# Patient Record
Sex: FEMALE | Race: WHITE | HISPANIC OR LATINO
[De-identification: names, ages, dates, MRNs, and addresses within clinical notes are randomized per-mention and may not be internally consistent; named-entity substitution may affect disease eponyms.]

---

## 2017-01-17 ENCOUNTER — RECORD ABSTRACTING (OUTPATIENT)
Age: 47
End: 2017-01-17

## 2017-01-17 DIAGNOSIS — R87.610 ATYPICAL SQUAMOUS CELLS OF UNDETERMINED SIGNIFICANCE ON CYTOLOGIC SMEAR OF CERVIX (ASC-US): ICD-10-CM

## 2017-01-17 DIAGNOSIS — Z78.9 OTHER SPECIFIED HEALTH STATUS: ICD-10-CM

## 2017-01-17 DIAGNOSIS — Z12.4 ENCOUNTER FOR SCREENING FOR MALIGNANT NEOPLASM OF CERVIX: ICD-10-CM

## 2017-01-17 DIAGNOSIS — Z86.018 PERSONAL HISTORY OF OTHER BENIGN NEOPLASM: ICD-10-CM

## 2017-03-15 ENCOUNTER — OUTPATIENT (OUTPATIENT)
Dept: OUTPATIENT SERVICES | Facility: HOSPITAL | Age: 47
LOS: 1 days | Discharge: HOME | End: 2017-03-15

## 2017-03-15 ENCOUNTER — APPOINTMENT (OUTPATIENT)
Dept: INTERNAL MEDICINE | Facility: CLINIC | Age: 47
End: 2017-03-15

## 2017-03-15 VITALS
BODY MASS INDEX: 25.52 KG/M2 | DIASTOLIC BLOOD PRESSURE: 68 MMHG | SYSTOLIC BLOOD PRESSURE: 104 MMHG | HEART RATE: 62 BPM | WEIGHT: 130 LBS | HEIGHT: 60 IN

## 2017-03-15 DIAGNOSIS — R73.03 PREDIABETES.: ICD-10-CM

## 2017-03-15 DIAGNOSIS — L30.9 DERMATITIS, UNSPECIFIED: ICD-10-CM

## 2017-03-15 DIAGNOSIS — D17.0 BENIGN LIPOMATOUS NEOPLASM OF SKIN AND SUBCUTANEOUS TISSUE OF HEAD, FACE AND NECK: ICD-10-CM

## 2017-03-15 DIAGNOSIS — E78.5 HYPERLIPIDEMIA, UNSPECIFIED: ICD-10-CM

## 2017-03-15 DIAGNOSIS — R74.0 NONSPECIFIC ELEVATION OF LEVELS OF TRANSAMINASE AND LACTIC ACID DEHYDROGENASE [LDH]: ICD-10-CM

## 2017-03-15 DIAGNOSIS — Z00.00 ENCOUNTER FOR GENERAL ADULT MEDICAL EXAMINATION W/OUT ABNORMAL FINDINGS: ICD-10-CM

## 2017-03-15 RX ORDER — HYDROCORTISONE 25 MG/G
2.5 CREAM TOPICAL TWICE DAILY
Qty: 1 | Refills: 0 | Status: ACTIVE | COMMUNITY
Start: 2017-03-15 | End: 1900-01-01

## 2017-03-22 ENCOUNTER — APPOINTMENT (OUTPATIENT)
Age: 47
End: 2017-03-22

## 2017-03-29 ENCOUNTER — APPOINTMENT (OUTPATIENT)
Dept: NUTRITION | Facility: CLINIC | Age: 47
End: 2017-03-29

## 2017-06-27 DIAGNOSIS — R94.5 ABNORMAL RESULTS OF LIVER FUNCTION STUDIES: ICD-10-CM

## 2017-06-27 DIAGNOSIS — E78.5 HYPERLIPIDEMIA, UNSPECIFIED: ICD-10-CM

## 2017-06-27 DIAGNOSIS — L72.9 FOLLICULAR CYST OF THE SKIN AND SUBCUTANEOUS TISSUE, UNSPECIFIED: ICD-10-CM

## 2017-06-27 DIAGNOSIS — L30.9 DERMATITIS, UNSPECIFIED: ICD-10-CM

## 2017-06-27 DIAGNOSIS — R73.03 PREDIABETES: ICD-10-CM

## 2017-09-13 ENCOUNTER — APPOINTMENT (OUTPATIENT)
Age: 47
End: 2017-09-13

## 2018-07-27 PROBLEM — R73.03 PREDIABETES: Status: ACTIVE | Noted: 2017-03-15

## 2023-04-13 ENCOUNTER — APPOINTMENT (OUTPATIENT)
Dept: OBGYN | Facility: CLINIC | Age: 53
End: 2023-04-13

## 2023-12-20 ENCOUNTER — APPOINTMENT (OUTPATIENT)
Dept: ORTHOPEDIC SURGERY | Facility: CLINIC | Age: 53
End: 2023-12-20

## 2024-01-02 ENCOUNTER — APPOINTMENT (OUTPATIENT)
Dept: ORTHOPEDIC SURGERY | Facility: CLINIC | Age: 54
End: 2024-01-02

## 2024-01-13 ENCOUNTER — APPOINTMENT (OUTPATIENT)
Dept: PAIN MANAGEMENT | Facility: CLINIC | Age: 54
End: 2024-01-13
Payer: OTHER MISCELLANEOUS

## 2024-01-13 VITALS
DIASTOLIC BLOOD PRESSURE: 66 MMHG | SYSTOLIC BLOOD PRESSURE: 101 MMHG | WEIGHT: 130 LBS | HEART RATE: 69 BPM | HEIGHT: 60 IN | BODY MASS INDEX: 25.52 KG/M2

## 2024-01-13 PROCEDURE — 99204 OFFICE O/P NEW MOD 45 MIN: CPT

## 2024-01-13 NOTE — PHYSICAL EXAM
[de-identified] : Lumbar Spine Exam: Inspection: erythema (-) ecchymosis (-) rashes (-) alignment: no scoliosis  Palpation: Midline lumbar tenderness:           L (-)    (-) paraspinal tenderness:                  L (-) ; R (-) sciatic nerve tenderness :             L (-) ; R (-) SI joint tenderness:                        L (-) ; R (-) GTB tenderness:                            L (-);  R (-)  Limited ROM 2/2 to pain with lumbar flexion and extension w/ rotation to R and L side  Strength: 5/5 throughout all muscle groups of the lower extremity                                     Right       Left    Hip Flexion:                (5/5)       (5/5) Quadriceps:               (5/5)       (5/5) Hamstrings:                (5/5)       (5/5) Ankle Dorsiflexion:     (5/5)       (5/5) EHL:                           (5/5)       (5/5) Ankle Plantarflexion:  (5/5)       (5/5)  Special Tests: SLR:                           R (-) ; L (-) Facet loading:            R (-) ; L (-) PAIGE test:               R (-) ; L (-)  Neurologic: Light touch intact throughout LE  Reflexes normal and symmetric   Gait: mildly antalgic gait ambulates w/o assistive device

## 2024-01-13 NOTE — HISTORY OF PRESENT ILLNESS
[FreeTextEntry1] : Ms. Edmonds is a 53-year-old female presenting to establish care for her lower back pain. She is here with her family member who is acting as a . 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 She states she was picking up a heavy object when she felt severe lower back pain.   She notes pain in the lower back which is associated with stiffness. Pain is made worse with flexion and extension. She states she cannot stand or walk for extended periods of time without pain. Pain has been limiting her ADLS. She states the pain is present daily and constant. She denies any referred pain into the legs or ay numbness or tingling. She denies any changes or bowel/bladder habits.

## 2024-01-13 NOTE — DISCUSSION/SUMMARY
[de-identified] : A discussion regarding available pain management treatment options occurred with the patient. These included interventional, rehabilitative, pharmacological, and alternative modalities. We will proceed with the following:   Interventions: 1. Begin aggressive physical therapy.  Physical therapy of the lumbar spine 2-3 times a week for 4-8 weeks stressing a home exercise program of walking, shoulder griddle strengthening,  swimming, elliptical , recumbent bike, Alexander chi and Yoga. Use things that heat like hot shower or icy heat before rehab, exercising and at the beginning of the day, and ice (ice in a bag never directly on the skin) after activity and at the end of the day.   2. Lifestyle modifications were discussed. No heavy lifting, bend at the waist, or over exertion.   Medications: 1. Ordered Meloxicam 15 mg daily as needed for pain.   - Follow up in 6 weeks   Patient was provided with a letter to avoid any heavy lifting while at work.   I Kelly Valenzuela attest that this documentation has been prepared under the direction and in the presence of provider Dr. Stiven Callahan.  The documentation recorded by the scribe in my presence, accurately reflects the service I personally performed, and the decisions made by me with my edits as appropriate.   Stiven Callahan, DO

## 2024-02-09 ENCOUNTER — RX RENEWAL (OUTPATIENT)
Age: 54
End: 2024-02-09

## 2024-02-27 ENCOUNTER — APPOINTMENT (OUTPATIENT)
Dept: PAIN MANAGEMENT | Facility: CLINIC | Age: 54
End: 2024-02-27
Payer: OTHER MISCELLANEOUS

## 2024-02-27 PROCEDURE — 99214 OFFICE O/P EST MOD 30 MIN: CPT

## 2024-02-27 NOTE — HISTORY OF PRESENT ILLNESS
[FreeTextEntry1] : Ms. Edmonds is a 53-year-old female presenting to establish care for her lower back pain. She is here with her family member who is acting as a . 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 She states she was picking up a heavy object when she felt severe lower back pain.   She notes pain in the lower back which is associated with stiffness. Pain is made worse with flexion and extension. She states she cannot stand or walk for extended periods of time without pain. Pain has been limiting her ADLS. She states the pain is present daily and constant. She denies any referred pain into the legs or ay numbness or tingling. She denies any changes or bowel/bladder habits.   TODAY, 2-: Revisit encounter. She is here with her daughter today.   She is presenting today with ongoing severe lower back pain. She has pain around the bilateral lower back with pain which is referred into the anterior portion of the thighs into the knees. Pain is made worse with standing or walking. Pain is relieved with sitting. She has sharp, shooting, stabbing like pains which can be debilitating. She has trialed Meloxicam as well as multiple sessions of physical therapy with no relief. She states physical therapy just made her pain worse. Pain is present daily and constant. She has significant limitation of her ADLs.

## 2024-02-27 NOTE — PHYSICAL EXAM
[de-identified] : Lumbar Spine Exam: Inspection: erythema (-) ecchymosis (-) rashes (-) alignment: no scoliosis  Palpation: Midline lumbar tenderness:           L (-)    (-) paraspinal tenderness:                  L (-) ; R (-) sciatic nerve tenderness :             L (-) ; R (-) SI joint tenderness:                        L (-) ; R (-) GTB tenderness:                            L (-);  R (-)  Limited ROM 2/2 to pain with lumbar flexion and extension w/ rotation to R and L side  Strength: 5/5 throughout all muscle groups of the lower extremity                                     Right       Left    Hip Flexion:                (5/5)       (5/5) Quadriceps:               (5/5)       (5/5) Hamstrings:                (5/5)       (5/5) Ankle Dorsiflexion:     (5/5)       (5/5) EHL:                           (5/5)       (5/5) Ankle Plantarflexion:  (5/5)       (5/5)  Special Tests: SLR:                           R (-) ; L (-) Facet loading:            R (-) ; L (-) PAIGE test:               R (-) ; L (-)  Neurologic: Light touch intact throughout LE  Reflexes normal and symmetric   Gait: mildly antalgic gait ambulates w/o assistive device

## 2024-02-27 NOTE — DISCUSSION/SUMMARY
[de-identified] : A discussion regarding available pain management treatment options occurred with the patient. These included interventional, rehabilitative, pharmacological, and alternative modalities. We will proceed with the following:   Interventions: - None indicated at this time.   Imagin. MRI lumbar spine w/o contrast to evaluate for anatomic changes of the lumbar discs, nerves, and surrounding tissue that will help provide information to accurately diagnose the patient's cause of pain and therefore treat said pain generator in the most effective way possible - whether that be specific physical therapy recommendations, medications, and/or interventional therapies.   Medications: 1. Continue with Meloxicam 15 mg daily as needed for pain.  I advised ROLY that the NSAID should be taken with food.  In addition while taking the prescribed NSAID, no over the counter or other NSAIDs should be used, such as ibuprofen (Motrin or Advil) or naproxen (Aleve) as this can cause stomach upset or other side effects.  If needed for fever or breakthrough pain Tylenol can be used.   - Follow up in 6 weeks   Patient was inquiring about a form to be filled out for work. I advised her she will need to bring in a copy of this form in order for me to fill this out.   I Kelly Valenzuela attest that this documentation has been prepared under the direction and in the presence of provider Dr. Stiven Callahan.  The documentation recorded by the scribe in my presence, accurately reflects the service I personally performed, and the decisions made by me with my edits as appropriate.   Stiven Callahan, DO

## 2024-03-13 ENCOUNTER — APPOINTMENT (OUTPATIENT)
Dept: MRI IMAGING | Facility: CLINIC | Age: 54
End: 2024-03-13
Payer: OTHER MISCELLANEOUS

## 2024-03-13 PROCEDURE — 72148 MRI LUMBAR SPINE W/O DYE: CPT

## 2024-03-20 ENCOUNTER — APPOINTMENT (OUTPATIENT)
Dept: PAIN MANAGEMENT | Facility: CLINIC | Age: 54
End: 2024-03-20
Payer: OTHER MISCELLANEOUS

## 2024-03-20 PROCEDURE — 99214 OFFICE O/P EST MOD 30 MIN: CPT

## 2024-03-21 NOTE — HISTORY OF PRESENT ILLNESS
[FreeTextEntry1] : Ms. Edmonds is a 53-year-old female presenting to establish care for her lower back pain. She is here with her family member who is acting as a . 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 She states she was picking up a heavy object when she felt severe lower back pain.   She notes pain in the lower back which is associated with stiffness. Pain is made worse with flexion and extension. She states she cannot stand or walk for extended periods of time without pain. Pain has been limiting her ADLS. She states the pain is present daily and constant. She denies any referred pain into the legs or ay numbness or tingling. She denies any changes or bowel/bladder habits.   TODAY, 3-: Revisit encounter. This is an active workers compensation injury. She is here with her daughter today who is acting as a .   She is presenting today with ongoing severe lower back pain. Her pain has remained the same since she was last seen. She is crying today in pain. She continues with pain around the bilateral lower back with pain which is referred into the anterior portion of the thighs into the knees. Pain is made worse with standing or walking. Pain is relieved with sitting. She has sharp, shooting, stabbing like pains which can be debilitating. She has trialed Meloxicam as well as multiple sessions of physical therapy with no relief. She states physical therapy just made her pain worse. Pain is present daily and constant. She has significant limitation of her ADLs. She denies any changes in bowel/bladder habits, fevers/chills or any recent weight loss. She is currently not working. She has been taking Meloxicam 15 mg which has not been beneficial. She has been undergoing 6 weeks' worth of physical therapy which during the sessions she has relief and then as soon as the session is completed the pain returns. Today is her last session of physical therapy.

## 2024-03-21 NOTE — PHYSICAL EXAM
[de-identified] : L spine   No rashes, erythema, edema noted TTP b/l lumbar paraspinals and sciatic notch Positive straight leg raise bilaterally LLE: 5/5 strength RLE: 5/5 strength Sensation intact b/l LE

## 2024-03-21 NOTE — DISCUSSION/SUMMARY
[de-identified] : A discussion regarding available pain management treatment options occurred with the patient. These included interventional, rehabilitative, pharmacological, and alternative modalities. We will proceed with the following:   Interventions: 1. Proceed with a Bilateral L5-S1 transforaminal epidural steroid injection with sedation.  Treatment options were discussed. The patient has been having persistent, severe low back pain and lumbar radicular pain that has minimally improved with conservative therapy thus far (including physical therapy, home stretching and anti-inflammatory medications. The patient was given the option of proceeding with a lumbar epidural steroid injection to try and get the patient some pain relief.  The risks and benefits were discussed, which included the associated problems with steroids, bleeding, nerve injury, lack of improvement with pain, and 0.5% chance of a post dural puncture headache.   The patient has severe anxiety of procedures that necessitates monitored anesthesia care (MAC). The procedure performed will be close to major nerves, arteries, and spinal cord and/or joint structures. Due to the proximity of these structures, we need the patient to be still during the procedure.  With the help of MAC, this will be safely achieved and decrease the risk of any complications.   * Will consider a Medial branch facet block if epidural injection is not beneficial.   Medications: 1. Ordered Pregabalin 75 mg to be taken nightly.  2. Ordered Methocarbamol 750 mg to be taken nightly.  - Discontinue Meloxicam as it is not beneficial.   We are prescribing a muscle relaxant medication to help the patient's muscle spasm pain. The patient understands to not take this medication before driving or operating any heavy machinery as it can be sedating.  - Follow up in 6 weeks post injection.   I Kelly Valenzuela attest that this documentation has been prepared under the direction and in the presence of provider Dr. Stiven Callahan.  The documentation recorded by the scribe in my presence, accurately reflects the service I personally performed, and the decisions made by me with my edits as appropriate.   Stiven Callahan, DO

## 2024-03-21 NOTE — DATA REVIEWED
[FreeTextEntry1] : MRI of the lumbar spine taken on 3- showed mild straightening of sagittal lordosis with mild rotary component. Lower lumbar spondylosis. L5-S1 mildly bulging intervertebral disc. Small L5-S1 central subligamentous disc protrusion insinuated between the S1 roots. L4-5 mild spondylosis with mild impingement of the intervertebral foramina. L3-4 mild loss of disc height.

## 2024-04-01 ENCOUNTER — RX RENEWAL (OUTPATIENT)
Age: 54
End: 2024-04-01

## 2024-04-01 RX ORDER — MELOXICAM 15 MG/1
15 TABLET ORAL
Qty: 30 | Refills: 0 | Status: ACTIVE | COMMUNITY
Start: 2024-01-13 | End: 1900-01-01

## 2024-04-25 ENCOUNTER — RX RENEWAL (OUTPATIENT)
Age: 54
End: 2024-04-25

## 2024-04-25 RX ORDER — PREGABALIN 75 MG/1
75 CAPSULE ORAL
Qty: 60 | Refills: 0 | Status: ACTIVE | COMMUNITY
Start: 2024-03-20 | End: 1900-01-01

## 2024-04-25 RX ORDER — CYCLOBENZAPRINE HYDROCHLORIDE 5 MG/1
5 TABLET, FILM COATED ORAL
Qty: 30 | Refills: 0 | Status: ACTIVE | COMMUNITY
Start: 2024-03-20 | End: 1900-01-01

## 2024-05-14 ENCOUNTER — APPOINTMENT (OUTPATIENT)
Dept: PAIN MANAGEMENT | Facility: CLINIC | Age: 54
End: 2024-05-14
Payer: OTHER MISCELLANEOUS

## 2024-05-14 DIAGNOSIS — M54.16 RADICULOPATHY, LUMBAR REGION: ICD-10-CM

## 2024-05-14 DIAGNOSIS — M54.50 LOW BACK PAIN, UNSPECIFIED: ICD-10-CM

## 2024-05-14 PROCEDURE — 99213 OFFICE O/P EST LOW 20 MIN: CPT

## 2024-05-14 NOTE — PHYSICAL EXAM
[de-identified] : L spine   No rashes, erythema, edema noted TTP b/l lumbar paraspinals and sciatic notch Positive straight leg raise bilaterally LLE: 5/5 strength RLE: 5/5 strength Sensation intact b/l LE

## 2024-05-14 NOTE — DISCUSSION/SUMMARY
[de-identified] : A discussion regarding available pain management treatment options occurred with the patient. These included interventional, rehabilitative, pharmacological, and alternative modalities. We will proceed with the following:   Interventions: - None indicated at this time.   Total clinician time spent today on the patient is 20 minutes including preparing to see the patient, obtaining and/or reviewing and confirming history, performing medically necessary and appropriate examination, counseling and educating the patient and/or family, documenting clinical information in the EHR and communicating and/or referring to other healthcare professionals.   - Follow up in 8 weeks.   I will clear the patient to return back to work as of 5-.   I Kelly Valenzuela attest that this documentation has been prepared under the direction and in the presence of provider Dr. Stiven Callahan.  The documentation recorded by the scribe in my presence, accurately reflects the service I personally performed, and the decisions made by me with my edits as appropriate.   Stiven Callahan, DO

## 2024-05-14 NOTE — HISTORY OF PRESENT ILLNESS
[FreeTextEntry1] : Ms. Edmonds is a 53-year-old female presenting to establish care for her lower back pain. She is here with her family member who is acting as a . 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 She states she was picking up a heavy object when she felt severe lower back pain.   She notes pain in the lower back which is associated with stiffness. Pain is made worse with flexion and extension. She states she cannot stand or walk for extended periods of time without pain. Pain has been limiting her ADLS. She states the pain is present daily and constant. She denies any referred pain into the legs or ay numbness or tingling. She denies any changes or bowel/bladder habits.   TODAY, 3-: Revisit encounter. This is an active workers compensation injury. She is here with her daughter today who is acting as a .   She is presenting today with ongoing severe lower back pain. Her pain has remained the same since she was last seen. She is crying today in pain. She continues with pain around the bilateral lower back with pain which is referred into the anterior portion of the thighs into the knees. Pain is made worse with standing or walking. Pain is relieved with sitting. She has sharp, shooting, stabbing like pains which can be debilitating. She has trialed Meloxicam as well as multiple sessions of physical therapy with no relief. She states physical therapy just made her pain worse. Pain is present daily and constant. She has significant limitation of her ADLs. She denies any changes in bowel/bladder habits, fevers/chills or any recent weight loss. She is currently not working. She has been taking Meloxicam 15 mg which has not been beneficial. She has been undergoing 6 weeks' worth of physical therapy which during the sessions she has relief and then as soon as the session is completed the pain returns. Today is her last session of physical therapy.   TODAY, 5-: Revisit encounter.   Since her last visit, she states her symptoms have much improved. She states her symptoms are currently tolerable. She denies any more back pain. She would like to return to work soon.

## 2024-08-14 ENCOUNTER — APPOINTMENT (OUTPATIENT)
Dept: PAIN MANAGEMENT | Facility: CLINIC | Age: 54
End: 2024-08-14
Payer: OTHER MISCELLANEOUS

## 2024-08-14 DIAGNOSIS — M51.26 OTHER INTERVERTEBRAL DISC DISPLACEMENT, LUMBAR REGION: ICD-10-CM

## 2024-08-14 DIAGNOSIS — M51.36 OTHER INTERVERTEBRAL DISC DEGENERATION, LUMBAR REGION: ICD-10-CM

## 2024-08-14 DIAGNOSIS — M54.16 RADICULOPATHY, LUMBAR REGION: ICD-10-CM

## 2024-08-14 DIAGNOSIS — M54.50 LOW BACK PAIN, UNSPECIFIED: ICD-10-CM

## 2024-08-14 PROCEDURE — 99213 OFFICE O/P EST LOW 20 MIN: CPT

## 2024-08-14 NOTE — PHYSICAL EXAM
[de-identified] : L spine   No rashes, erythema, edema noted TTP b/l lumbar paraspinals and sciatic notch Positive straight leg raise bilaterally LLE: 5/5 strength RLE: 5/5 strength Sensation intact b/l LE

## 2024-08-14 NOTE — DISCUSSION/SUMMARY
[de-identified] : A discussion regarding available pain management treatment options occurred with the patient. These included interventional, rehabilitative, pharmacological, and alternative modalities. We will proceed with the following:   Interventions: - None indicated at this time.   Total clinician time spent today on the patient is 20 minutes including preparing to see the patient, obtaining and/or reviewing and confirming history, performing medically necessary and appropriate examination, counseling and educating the patient and/or family, documenting clinical information in the EHR and communicating and/or referring to other healthcare professionals.   - Follow up in 8 weeks.   Pt is back to work as full duty on June 29th, 2024.   YASEMIN Mckeon, DO

## 2024-08-14 NOTE — HISTORY OF PRESENT ILLNESS
[FreeTextEntry1] : Ms. Edmonds is a 53-year-old female presenting to establish care for her lower back pain. She is here with her family member who is acting as a . 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 She states she was picking up a heavy object when she felt severe lower back pain.   She notes pain in the lower back which is associated with stiffness. Pain is made worse with flexion and extension. She states she cannot stand or walk for extended periods of time without pain. Pain has been limiting her ADLS. She states the pain is present daily and constant. She denies any referred pain into the legs or ay numbness or tingling. She denies any changes or bowel/bladder habits.   TODAY, 3-: Revisit encounter. This is an active workers compensation injury. She is here with her daughter today who is acting as a .   She is presenting today with ongoing severe lower back pain. Her pain has remained the same since she was last seen. She is crying today in pain. She continues with pain around the bilateral lower back with pain which is referred into the anterior portion of the thighs into the knees. Pain is made worse with standing or walking. Pain is relieved with sitting. She has sharp, shooting, stabbing like pains which can be debilitating. She has trialed Meloxicam as well as multiple sessions of physical therapy with no relief. She states physical therapy just made her pain worse. Pain is present daily and constant. She has significant limitation of her ADLs. She denies any changes in bowel/bladder habits, fevers/chills or any recent weight loss. She is currently not working. She has been taking Meloxicam 15 mg which has not been beneficial. She has been undergoing 6 weeks' worth of physical therapy which during the sessions she has relief and then as soon as the session is completed the pain returns. Today is her last session of physical therapy.   TODAY, 5-: Revisit encounter.   Since her last visit, she states her symptoms have much improved. She states her symptoms are currently tolerable. She denies any more back pain. She would like to return to work soon.  Today 8/14/24 Since her last visit, she states her symptoms have much improved. She states her symptoms are currently tolerable. She denies any more back pain. She went back to work as full duty on June 29th, 2024.

## 2024-10-11 ENCOUNTER — APPOINTMENT (OUTPATIENT)
Dept: PAIN MANAGEMENT | Facility: CLINIC | Age: 54
End: 2024-10-11